# Patient Record
Sex: FEMALE | Race: WHITE | NOT HISPANIC OR LATINO | Employment: OTHER | URBAN - METROPOLITAN AREA
[De-identification: names, ages, dates, MRNs, and addresses within clinical notes are randomized per-mention and may not be internally consistent; named-entity substitution may affect disease eponyms.]

---

## 2020-11-09 ENCOUNTER — THERAPY VISIT (OUTPATIENT)
Dept: PHYSICAL THERAPY | Facility: CLINIC | Age: 55
End: 2020-11-09
Payer: COMMERCIAL

## 2020-11-09 DIAGNOSIS — M62.89 PELVIC FLOOR DYSFUNCTION IN FEMALE: ICD-10-CM

## 2020-11-09 DIAGNOSIS — K59.02 CONSTIPATION DUE TO OUTLET DYSFUNCTION: ICD-10-CM

## 2020-11-09 DIAGNOSIS — N39.46 MIXED INCONTINENCE: ICD-10-CM

## 2020-11-09 PROCEDURE — 97112 NEUROMUSCULAR REEDUCATION: CPT | Mod: GP | Performed by: PHYSICAL THERAPIST

## 2020-11-09 PROCEDURE — 97161 PT EVAL LOW COMPLEX 20 MIN: CPT | Mod: GP | Performed by: PHYSICAL THERAPIST

## 2020-11-09 PROCEDURE — 97530 THERAPEUTIC ACTIVITIES: CPT | Mod: GP | Performed by: PHYSICAL THERAPIST

## 2020-11-09 NOTE — PROGRESS NOTES
[facility] Initial Evaluation  Subjective:  The history is provided by the patient. No  was used.   Patient Health History  Amina Harden being seen for pelvic floor.     Date of Onset: unsure.   Problem occurred: unsure   Pain is reported as 0/10 on pain scale.  General health as reported by patient is good.  Pertinent medical history includes: overweight, menopausal and other (vertigo).     Medical allergies: none.   Surgeries include:  Other (mohs).    Current medications:  Other (probiotic).    Current occupation is self employed realtor.   Primary job tasks include:  Prolonged sitting, prolonged standing, lifting/carrying and repetitive tasks.                  Therapist Generated HPI Evaluation  Problem details: Date of MD order for this visit was 9-25-20. Amina was referred by Dr Horvath with [facility] Rect[facility] Surgery. Amina states she is being seen for weakened pelvic floor muscles with constipation and urinary issues. This has been for 4-5 years, with the past year being the worst.     Bowel-States she does well if she eats healthy and stays on her probiotic. Has a BM daily, 1-2x, type 4 in general on [facility]ol stool chart. She will has 1-2 bouts of constipation per month. Sometimes has to strain to go but if constipated she will sometimes have to manually go into the vagina and push to assist(not very often). Does not monitor fiber but does eat a lot of veggies and whole grain.     Bladder-Wears a pantiliner when out but not always, does wear for volleyball or when showing a lot of houses. States she can have urinary leakage with playing volleyball, sometimes with cough/sneeze. Wakes at night to void 2-3x and she may dribble on way to bathroom with this. She has had 6 episodes (in past 3 mos) where she lost full bladder of urine. Amina does state she has urgency. Day voids 2-4 hrs, but when close to bathroom (working from home)she gets the urgency and wants to void more.  Some days will have 6 leakage episodes during the day and others less. Urine stream is small amounts, does not have dribbling after voiding and does feel empty    Fluids per day- 60 oz water, 8-10 oz coffee, 24 oz milk     No internal pelvic pain noted    Gets exercise with work, not as much during this covid year. Hip pain if walks for exercise.   .         Type of problem:  Pelvic dysfunction, other and incontinence (pelvic floor weakness, constipation, urinary urgency).    This is a chronic condition.  Condition occurred with:  Insidious onset.  Where condition occurred: for unknown reasons.  Patient reports pain:  N/a (no pelvic pain, does have body pain in general).      Since onset symptoms are gradually worsening.  Symptoms are exacerbated by sneezing, coughing and other (volleyball, being near bathroom)  and relieved by other (eating better and drinking more water for bowel, nothing bladder).      Restrictions due to condition include:  Working in normal job without restrictions.  Barriers include:  None as reported by patient.                        Objective:  System                                 Pelvic Dysfunction Evaluation:        Flexibility:    Tightness present at:Hamstrings and Piriformis    Abdominal Wall:  Abdominal wall pelvic: hypomobile R LQ>L, inferior ribs.        Pelvic Clock Exam:    Ischiocavernosis pain:  -  Bulbocavernosis pain:  -  Transverse Perineal:  -  Levator ANI:  -  Perineal Body:  -  SI Provocation:    Positive for: ASLR      Reflex Testing:  NA    External Assessment:  External assessment pelvic: mild atrophy, states she does have small rectocele.  Skin Condition:  Hemorrhoids      Tissue Symmetry:  Normal  Introitus:  Normal  Muscle Contraction/Perineal Mobility:  Substitution  Internal Assessment:  Internal assessment pelvic: weak mm activation but able to activate through all layers(L deep less)  and initially heavy glute use, mild adductor use L.  Sensory Exam:   Normal  Contraction/Grade:  Fair squeeze, definite lift (3)  Accessory Muscle use-Abdominals:  X  Accessory Muscle use-Gluteals:  X      SEMG Biofeedback:  Semg biofeedback pelvic: seated defecation position-high rest tone, poor pushing mechanics(breath holding)  Equipment:  Pathway    Suraface electrode placement--Perianal:  X  Baseline EMG PM:  5.5 to 3.6 uV      Sustained contraction:  Phasic ave=11  tonic ave=9  EMG interpretation to fatigue:  8-10 seconds  Position:  SupineAdditional History:  Delivery History:  Vaginal delivery and tearing  Number of Pregnancies: 5  Number of Live Births: 5  Caffeine Consumption:  8-10 oz          Hip Evaluation  Hip PROM:  : tight end range. : tight end range.                          Hip Strength:    Flexion:   Left: 5/5   Pain:  Right: 5/5   Pain:                    Extension:  Left: 4-/5  Pain:Right: 4+/5    Pain:    Abduction:  Left: 4+/5     Pain:Right: 4+/5    Pain:    Internal Rotation:  Left: 5/5    Pain:Right: 5/5   Pain:  External Rotation:  Left: 5/5   Pain:  Right: 5/5   Pain:                           General     ROS    Assessment/Plan:    Patient is a 55 year old female with pelvic complaints.    Patient has the following significant findings with corresponding treatment plan.                Diagnosis 1:  Mixed incontinence, constipation, pelvic floor dysfunction  Decreased ROM/flexibility - manual therapy, therapeutic exercise and home program  Decreased strength - therapeutic exercise, therapeutic activities and home program  Decreased proprioception - neuro re-education, therapeutic activities and home program  Impaired muscle performance - biofeedback, neuro re-education and home program  Decreased function - therapeutic activities and home program    Therapy Evaluation Codes:   1) History comprised of:   Personal factors that impact the plan of care:      Age and Time since onset of symptoms.    Comorbidity factors that impact the plan of care are:       None.     Medications impacting care: None.  2) Examination of Body Systems comprised of:   Body structures and functions that impact the plan of care:      Hip and Pelvis.   Activity limitations that impact the plan of care are:      Sports, Frequency, Stress incontinence, Urgency, Urge incontinence and constipation.  3) Clinical presentation characteristics are:   Stable/Uncomplicated.  4) Decision-Making    Low complexity using standardized patient assessment instrument and/or measureable assessment of functional outcome.  Cumulative Therapy Evaluation is: Low complexity.    Previous and current functional limitations:  (See Goal Flow Sheet for this information)    Short term and Long term goals: (See Goal Flow Sheet for this information)     Communication ability:  Patient appears to be able to clearly communicate and understand verbal and written communication and follow directions correctly.  Treatment Explanation - The following has been discussed with the patient:   RX ordered/plan of care  Anticipated outcomes  Possible risks and side effects  This patient would benefit from PT intervention to resume normal activities.   Rehab potential is good.    Frequency:  1 X week, once daily  Duration:  for 4 weeks tapering to 2 X a month over 2 months  Discharge Plan:  Achieve all LTG.  Independent in home treatment program.  Reach maximal therapeutic benefit.    Please refer to the daily flowsheet for treatment today, total treatment time and time spent performing 1:1 timed codes.

## 2020-11-16 ENCOUNTER — THERAPY VISIT (OUTPATIENT)
Dept: PHYSICAL THERAPY | Facility: CLINIC | Age: 55
End: 2020-11-16
Payer: COMMERCIAL

## 2020-11-16 DIAGNOSIS — M62.89 PELVIC FLOOR DYSFUNCTION IN FEMALE: ICD-10-CM

## 2020-11-16 DIAGNOSIS — N39.46 MIXED INCONTINENCE: ICD-10-CM

## 2020-11-16 DIAGNOSIS — K59.02 CONSTIPATION DUE TO OUTLET DYSFUNCTION: ICD-10-CM

## 2020-11-16 PROCEDURE — 97110 THERAPEUTIC EXERCISES: CPT | Mod: GP | Performed by: PHYSICAL THERAPIST

## 2020-11-16 PROCEDURE — 97112 NEUROMUSCULAR REEDUCATION: CPT | Mod: GP | Performed by: PHYSICAL THERAPIST

## 2020-11-23 ENCOUNTER — THERAPY VISIT (OUTPATIENT)
Dept: PHYSICAL THERAPY | Facility: CLINIC | Age: 55
End: 2020-11-23
Payer: COMMERCIAL

## 2020-11-23 DIAGNOSIS — N39.46 MIXED INCONTINENCE: ICD-10-CM

## 2020-11-23 DIAGNOSIS — K59.02 CONSTIPATION DUE TO OUTLET DYSFUNCTION: ICD-10-CM

## 2020-11-23 DIAGNOSIS — M62.89 PELVIC FLOOR DYSFUNCTION IN FEMALE: ICD-10-CM

## 2020-11-23 PROCEDURE — 97140 MANUAL THERAPY 1/> REGIONS: CPT | Mod: GP | Performed by: PHYSICAL THERAPIST

## 2020-11-23 PROCEDURE — 97112 NEUROMUSCULAR REEDUCATION: CPT | Mod: GP | Performed by: PHYSICAL THERAPIST

## 2020-11-30 ENCOUNTER — THERAPY VISIT (OUTPATIENT)
Dept: PHYSICAL THERAPY | Facility: CLINIC | Age: 55
End: 2020-11-30
Payer: COMMERCIAL

## 2020-11-30 DIAGNOSIS — N39.46 MIXED INCONTINENCE: ICD-10-CM

## 2020-11-30 DIAGNOSIS — K59.02 CONSTIPATION DUE TO OUTLET DYSFUNCTION: ICD-10-CM

## 2020-11-30 DIAGNOSIS — M62.89 PELVIC FLOOR DYSFUNCTION IN FEMALE: ICD-10-CM

## 2020-11-30 PROCEDURE — 97110 THERAPEUTIC EXERCISES: CPT | Mod: GP | Performed by: PHYSICAL THERAPIST

## 2020-11-30 PROCEDURE — 97140 MANUAL THERAPY 1/> REGIONS: CPT | Mod: GP | Performed by: PHYSICAL THERAPIST

## 2020-11-30 NOTE — LETTER
[facility]  [address]  [address]  [place] [address]  [phone]    2021    Re: Amina Harden   :   1965  MRN:  [id number]   REFERRING PHYSICIAN:   Carolyn PIERCE PT    Date of Initial Evaluation:  2020  Visits:  Rxs Used: 4  Reason for Referral:     Mixed incontinence  Constipation due to outlet dysfunction  Pelvic floor dysfunction in female    EVALUATION SUMMARY    Discharge Note    Progress reporting period is from initial evaluation date (please see noted date below) to 2020.  Linked Episodes   Type: Episode: Status: Noted: Resolved: Last update: Updated by:   PHYSICAL THERAPY pelvic floor 20 Active 2020 10:02 AM Amie Umanzor, PT      Comments:       Amina failed to follow up and current status is unknown.  Please see information below for last relevant information on current status.  Patient seen for 4 visits.    SUBJECTIVE  Subjective changes noted by patient: The following is from last visit seen- slight set back with thanksgiving foods with bowel system otherwise has been improved  .  Current pain level is  .  0/10 pelvic floor  Previous pain level was  0/10(pelvic floor).   Changes in function:  Yes (See Goal flowsheet attached for changes in current functional level)  Adverse reaction to treatment or activity: None    OBJECTIVE  Changes noted in objective findings: did not complete POC  ASSESSMENT/PLAN  Diagnosis: pelvic floor dysfunction, mixed incontinence, constipation   Updated problem list and treatment plan:   Did not complete plan of care, continue with HEP    Re: Amina Harden   :   1965    STG/LTGs have been met or progress has been made towards goals:  Yes, please see goal flowsheet for most current information  Assessment of Progress: current status is unknown.    Last current status:     Self Management Plans:  HEP  I have re-evaluated this patient and find that the  nature, scope, duration and intensity of the therapy is appropriate for the medical condition of the patient.  Amina continues to require the following intervention to meet STG and LTG's:  HEP.    Recommendations:  Discharge with current home program.  Patient to follow up with MD as needed.    Thank you for your referral.    INQUIRIES  Therapist: Amie Umanzor, PT  [facility]  [address]  [place] [address]  Phone: [phone]  Fax: [phone]

## 2021-01-20 PROBLEM — N39.46 MIXED INCONTINENCE: Status: RESOLVED | Noted: 2020-11-09 | Resolved: 2021-01-20

## 2021-01-20 PROBLEM — M62.89 PELVIC FLOOR DYSFUNCTION IN FEMALE: Status: RESOLVED | Noted: 2020-11-09 | Resolved: 2021-01-20

## 2021-01-20 PROBLEM — K59.02 CONSTIPATION DUE TO OUTLET DYSFUNCTION: Status: RESOLVED | Noted: 2020-11-09 | Resolved: 2021-01-20

## 2021-01-20 NOTE — PROGRESS NOTES
Discharge Note    Progress reporting period is from initial evaluation date (please see noted date below) to Nov 30, 2020.  Linked Episodes   Type: Episode: Status: Noted: Resolved: Last update: Updated by:   PHYSICAL THERAPY pelvic floor 11-9-20 Active 11/9/2020 11/30/2020 10:02 AM Amie Umanzor PT      Comments:       Amina failed to follow up and current status is unknown.  Please see information below for last relevant information on current status.  Patient seen for 4 visits.    SUBJECTIVE  Subjective changes noted by patient: The following is from last visit seen- slight set back with thanksgiving foods with bowel system otherwise has been improved  .  Current pain level is  .  0/10 pelvic floor  Previous pain level was  0/10(pelvic floor).   Changes in function:  Yes (See Goal flowsheet attached for changes in current functional level)  Adverse reaction to treatment or activity: None    OBJECTIVE  Changes noted in objective findings: did not complete POC  ASSESSMENT/PLAN  Diagnosis: pelvic floor dysfunction, mixed incontinence, constipation   Updated problem list and treatment plan:   Did not complete plan of care, continue with HEP  STG/LTGs have been met or progress has been made towards goals:  Yes, please see goal flowsheet for most current information  Assessment of Progress: current status is unknown.    Last current status:     Self Management Plans:  HEP  I have re-evaluated this patient and find that the nature, scope, duration and intensity of the therapy is appropriate for the medical condition of the patient.  Amina continues to require the following intervention to meet STG and LTG's:  HEP.    Recommendations:  Discharge with current home program.  Patient to follow up with MD as needed.    Please refer to the daily flowsheet for treatment today, total treatment time and time spent performing 1:1 timed codes.

## 2023-12-08 ENCOUNTER — HOSPITAL ENCOUNTER (EMERGENCY)
Facility: CLINIC | Age: 58
Discharge: HOME | End: 2023-12-09
Attending: EMERGENCY MEDICINE | Admitting: EMERGENCY MEDICINE
Payer: COMMERCIAL

## 2023-12-08 DIAGNOSIS — R10.84 GENERALIZED ABDOMINAL PAIN: ICD-10-CM

## 2023-12-08 LAB
BASOPHILS # BLD AUTO: 0 10E3/UL (ref 0–0.2)
BASOPHILS NFR BLD AUTO: 0 %
EOSINOPHIL # BLD AUTO: 0.1 10E3/UL (ref 0–0.7)
EOSINOPHIL NFR BLD AUTO: 1 %
ERYTHROCYTE [DISTWIDTH] IN BLOOD BY AUTOMATED COUNT: 12.7 % (ref 10–15)
HCT VFR BLD AUTO: 39.4 % (ref 35–47)
HGB BLD-MCNC: 13.1 G/DL (ref 11.7–15.7)
IMM GRANULOCYTES # BLD: 0 10E3/UL
IMM GRANULOCYTES NFR BLD: 0 %
LYMPHOCYTES # BLD AUTO: 1.4 10E3/UL (ref 0.8–5.3)
LYMPHOCYTES NFR BLD AUTO: 12 %
MCH RBC QN AUTO: 28.2 PG (ref 26.5–33)
MCHC RBC AUTO-ENTMCNC: 33.2 G/DL (ref 31.5–36.5)
MCV RBC AUTO: 85 FL (ref 78–100)
MONOCYTES # BLD AUTO: 0.5 10E3/UL (ref 0–1.3)
MONOCYTES NFR BLD AUTO: 5 %
NEUTROPHILS # BLD AUTO: 9.6 10E3/UL (ref 1.6–8.3)
NEUTROPHILS NFR BLD AUTO: 82 %
NRBC # BLD AUTO: 0 10E3/UL
NRBC BLD AUTO-RTO: 0 /100
PLATELET # BLD AUTO: 225 10E3/UL (ref 150–450)
RBC # BLD AUTO: 4.65 10E6/UL (ref 3.8–5.2)
WBC # BLD AUTO: 11.7 10E3/UL (ref 4–11)

## 2023-12-08 PROCEDURE — 85014 HEMATOCRIT: CPT | Performed by: FAMILY MEDICINE

## 2023-12-08 PROCEDURE — 80053 COMPREHEN METABOLIC PANEL: CPT | Performed by: FAMILY MEDICINE

## 2023-12-08 PROCEDURE — 99284 EMERGENCY DEPT VISIT MOD MDM: CPT | Mod: 25 | Performed by: EMERGENCY MEDICINE

## 2023-12-08 PROCEDURE — 84484 ASSAY OF TROPONIN QUANT: CPT | Performed by: EMERGENCY MEDICINE

## 2023-12-08 PROCEDURE — 99284 EMERGENCY DEPT VISIT MOD MDM: CPT | Performed by: EMERGENCY MEDICINE

## 2023-12-08 PROCEDURE — 83690 ASSAY OF LIPASE: CPT | Performed by: EMERGENCY MEDICINE

## 2023-12-08 PROCEDURE — 36415 COLL VENOUS BLD VENIPUNCTURE: CPT | Performed by: FAMILY MEDICINE

## 2023-12-09 VITALS
HEART RATE: 64 BPM | OXYGEN SATURATION: 97 % | SYSTOLIC BLOOD PRESSURE: 178 MMHG | BODY MASS INDEX: 31.31 KG/M2 | WEIGHT: 206.6 LBS | DIASTOLIC BLOOD PRESSURE: 104 MMHG | TEMPERATURE: 98.6 F | RESPIRATION RATE: 20 BRPM | HEIGHT: 68 IN

## 2023-12-09 PROBLEM — E78.00 HYPERCHOLESTEROLEMIA: Status: ACTIVE | Noted: 2022-01-20

## 2023-12-09 PROBLEM — K62.5 HEMORRHAGE OF RECTUM AND ANUS: Status: ACTIVE | Noted: 2020-06-18

## 2023-12-09 PROBLEM — E66.811 OBESITY (BMI 30.0-34.9): Status: ACTIVE | Noted: 2020-08-18

## 2023-12-09 PROBLEM — K64.8 INTERNAL HEMORRHOIDS: Status: ACTIVE | Noted: 2020-08-14

## 2023-12-09 PROBLEM — M62.89 PELVIC FLOOR DYSFUNCTION: Status: ACTIVE | Noted: 2023-03-22

## 2023-12-09 PROBLEM — C44.90 MALIGNANT NEOPLASM OF SKIN: Status: ACTIVE | Noted: 2020-02-11

## 2023-12-09 PROBLEM — R00.2 HEART PALPITATIONS: Status: ACTIVE | Noted: 2020-08-18

## 2023-12-09 PROBLEM — K59.00 CONSTIPATION: Status: ACTIVE | Noted: 2023-03-22

## 2023-12-09 LAB
ALBUMIN SERPL BCG-MCNC: 4.5 G/DL (ref 3.5–5.2)
ALBUMIN UR-MCNC: NEGATIVE MG/DL
ALP SERPL-CCNC: 105 U/L (ref 40–150)
ALT SERPL W P-5'-P-CCNC: 14 U/L (ref 0–50)
ANION GAP SERPL CALCULATED.3IONS-SCNC: 10 MMOL/L (ref 7–15)
APPEARANCE UR: ABNORMAL
AST SERPL W P-5'-P-CCNC: 19 U/L (ref 0–45)
BILIRUB SERPL-MCNC: 0.2 MG/DL
BILIRUB UR QL STRIP: NEGATIVE
BUN SERPL-MCNC: 13.5 MG/DL (ref 8–23)
CALCIUM SERPL-MCNC: 9.4 MG/DL (ref 8.8–10.2)
CHLORIDE SERPL-SCNC: 102 MMOL/L (ref 98–107)
COLOR UR AUTO: YELLOW
CREAT SERPL-MCNC: 0.63 MG/DL (ref 0.51–0.95)
DEPRECATED HCO3 PLAS-SCNC: 24 MMOL/L (ref 22–29)
EGFRCR SERPLBLD CKD-EPI 2021: >90 ML/MIN/1.73M2
GLUCOSE SERPL-MCNC: 138 MG/DL (ref 70–99)
GLUCOSE UR STRIP-MCNC: NEGATIVE MG/DL
HGB UR QL STRIP: ABNORMAL
HOLD SPECIMEN: NORMAL
HOLD SPECIMEN: NORMAL
KETONES UR STRIP-MCNC: NEGATIVE MG/DL
LEUKOCYTE ESTERASE UR QL STRIP: NEGATIVE
LIPASE SERPL-CCNC: 20 U/L (ref 13–60)
NITRATE UR QL: NEGATIVE
PH UR STRIP: 7 [PH] (ref 5–7)
POTASSIUM SERPL-SCNC: 4 MMOL/L (ref 3.4–5.3)
PROT SERPL-MCNC: 7.4 G/DL (ref 6.4–8.3)
RBC URINE: 5 /HPF
SODIUM SERPL-SCNC: 136 MMOL/L (ref 135–145)
SP GR UR STRIP: 1.02 (ref 1–1.03)
TROPONIN T SERPL HS-MCNC: <6 NG/L
UROBILINOGEN UR STRIP-MCNC: NORMAL MG/DL
WBC URINE: 3 /HPF
YEAST #/AREA URNS HPF: ABNORMAL /HPF

## 2023-12-09 PROCEDURE — 250N000013 HC RX MED GY IP 250 OP 250 PS 637: Performed by: EMERGENCY MEDICINE

## 2023-12-09 PROCEDURE — 250N000011 HC RX IP 250 OP 636: Mod: JZ | Performed by: EMERGENCY MEDICINE

## 2023-12-09 PROCEDURE — 93005 ELECTROCARDIOGRAM TRACING: CPT | Performed by: EMERGENCY MEDICINE

## 2023-12-09 PROCEDURE — 96374 THER/PROPH/DIAG INJ IV PUSH: CPT | Performed by: EMERGENCY MEDICINE

## 2023-12-09 PROCEDURE — 258N000003 HC RX IP 258 OP 636: Performed by: EMERGENCY MEDICINE

## 2023-12-09 PROCEDURE — 96361 HYDRATE IV INFUSION ADD-ON: CPT | Performed by: EMERGENCY MEDICINE

## 2023-12-09 PROCEDURE — 81001 URINALYSIS AUTO W/SCOPE: CPT | Performed by: FAMILY MEDICINE

## 2023-12-09 PROCEDURE — 93010 ELECTROCARDIOGRAM REPORT: CPT | Performed by: EMERGENCY MEDICINE

## 2023-12-09 RX ORDER — METOCLOPRAMIDE HYDROCHLORIDE 5 MG/ML
10 INJECTION INTRAMUSCULAR; INTRAVENOUS ONCE
Status: COMPLETED | OUTPATIENT
Start: 2023-12-09 | End: 2023-12-09

## 2023-12-09 RX ORDER — POLYETHYLENE GLYCOL 3350 17 G/17G
17 POWDER, FOR SOLUTION ORAL ONCE
Status: COMPLETED | OUTPATIENT
Start: 2023-12-09 | End: 2023-12-09

## 2023-12-09 RX ADMIN — METOCLOPRAMIDE HYDROCHLORIDE 10 MG: 5 INJECTION INTRAMUSCULAR; INTRAVENOUS at 00:42

## 2023-12-09 RX ADMIN — POLYETHYLENE GLYCOL 3350 17 G: 17 POWDER, FOR SOLUTION ORAL at 00:42

## 2023-12-09 RX ADMIN — SODIUM CHLORIDE, POTASSIUM CHLORIDE, SODIUM LACTATE AND CALCIUM CHLORIDE 1000 ML: 600; 310; 30; 20 INJECTION, SOLUTION INTRAVENOUS at 00:39

## 2023-12-09 ASSESSMENT — ENCOUNTER SYMPTOMS
COUGH: 0
ABDOMINAL PAIN: 1
NAUSEA: 1
BACK PAIN: 0
SHORTNESS OF BREATH: 0
DIARRHEA: 0
VOMITING: 0
LIGHT-HEADEDNESS: 0
DYSURIA: 0
FLANK PAIN: 0
DIAPHORESIS: 0
CONSTIPATION: 1
FATIGUE: 0
APPETITE CHANGE: 1
HEADACHES: 0
CHEST TIGHTNESS: 0
FREQUENCY: 0
ABDOMINAL DISTENTION: 1
FEVER: 0

## 2023-12-09 ASSESSMENT — ACTIVITIES OF DAILY LIVING (ADL): ADLS_ACUITY_SCORE: 35

## 2023-12-09 NOTE — ED NOTES
"Patient states \"I am feeling better, I would like to go home now, I am getting a headache from being over tired.\"  "

## 2023-12-09 NOTE — ED PROVIDER NOTES
History     Chief Complaint   Patient presents with    Abdominal Pain    Back Pain     HPI  Amina Harden is a 58 year old female with a history of obesity and hyperlipidemia presenting for evaluation of abdominal pain.  Patient was abrupt onset of severe abdominal pain tonight.  She reports diffuse pain across her upper abdomen and both sides of her abdomen radiating through to her back on both sides of her mid back.  Pain was associated with nausea but no vomiting.  Patient reports she has been feeling somewhat backed up recently.  She states  her diet has been slightly off recently due to family visiting from out of town.  Patient denies any chest pain or difficulty breathing.  Symptoms were excruciating earlier tonight but have subsided while in the ED.  Patient reports she still has been having regular bowel movements but they have been much narrower than normal.  Denies any rectal pain or pressure currently.  Still feels very bloated throughout her abdomen especially her upper abdomen.  Denies any dysuria, urgency, frequency.  Has not had her menses in several years and denies vaginal discharge or bleeding.    Allergies:  No Known Allergies    Problem List:    Patient Active Problem List    Diagnosis Date Noted    Constipation 03/22/2023     Priority: Medium    Pelvic floor dysfunction 03/22/2023     Priority: Medium    Hypercholesterolemia 01/20/2022     Priority: Medium     Formatting of this note might be different from the original. ASCVD risk score 3.2%, recommended lifestyle changes      Heart palpitations 08/18/2020     Priority: Medium    Obesity (BMI 30.0-34.9) 08/18/2020     Priority: Medium    Internal hemorrhoids 08/14/2020     Priority: Medium    Hemorrhage of rectum and anus 06/18/2020     Priority: Medium    Malignant neoplasm of skin 02/11/2020     Priority: Medium     Formatting of this note might be different from the original. 2/2020, nasal tip, BCC: [facility] [place] 2/25/2020 [facility] 2/21/2022,  "left mid cheek, Basal Cell Carcinoma, 5/10/2022 [facility] [place] [facility]      Depressive disorder 09/15/2006     Priority: Medium        Past Medical History:    No past medical history on file.    Past Surgical History:    No past surgical history on file.    Family History:    No family history on file.    Social History:  Marital Status:   [4]        Medications:    No current outpatient medications on file.        Review of Systems   Constitutional:  Positive for appetite change (Decreased). Negative for diaphoresis, fatigue and fever.   HENT:  Negative for congestion.    Respiratory:  Negative for cough, chest tightness and shortness of breath.    Cardiovascular:  Negative for chest pain.   Gastrointestinal:  Positive for abdominal distention, abdominal pain, constipation and nausea. Negative for diarrhea and vomiting.   Genitourinary:  Negative for decreased urine volume, dysuria, flank pain, frequency, urgency and vaginal bleeding.   Musculoskeletal:  Negative for back pain.   Neurological:  Negative for light-headedness and headaches.   All other systems reviewed and are negative.      Physical Exam   BP: (!) 189/117  Pulse: 64  Temp: 98.6  F (37  C)  Resp: 20  Height: 172.7 cm (5' 8\")  Weight: 93.7 kg (206 lb 9.6 oz)  SpO2: 97 %      Physical Exam  Vitals and nursing note reviewed.   Constitutional:       Appearance: She is well-developed. She is obese. She is not ill-appearing or diaphoretic.   HENT:      Head: Atraumatic.   Cardiovascular:      Rate and Rhythm: Normal rate.   Pulmonary:      Effort: Pulmonary effort is normal.   Abdominal:      General: Abdomen is protuberant. Bowel sounds are normal.      Palpations: Abdomen is soft.      Tenderness: There is abdominal tenderness in the epigastric area. There is no guarding or rebound.   Skin:     General: Skin is warm and dry.      Capillary Refill: Capillary refill takes less than 2 seconds.   Neurological:      Mental Status: She is alert and " oriented to person, place, and time.   Psychiatric:         Mood and Affect: Mood normal.         ED Course                 Procedures              EKG Interpretation:      Interpreted by Bao Desir MD  Time reviewed:0039   Symptoms at time of EKG: abdominal pain, right shoulder pain   Rhythm: normal sinus   Rate: normal  Axis: NORMAL  Ectopy: none  Conduction: normal  ST Segments/ T Waves: No ST-T wave changes  Q Waves: none  Comparison to prior: No old EKG available    Clinical Impression: normal EKG         Results for orders placed or performed during the hospital encounter of 12/08/23 (from the past 24 hour(s))   CBC with platelets, differential    Narrative    The following orders were created for panel order CBC with platelets, differential.  Procedure                               Abnormality         Status                     ---------                               -----------         ------                     CBC with platelets and d...[[id number]]  Abnormal            Final result                 Please view results for these tests on the individual orders.   Comprehensive metabolic panel   Result Value Ref Range    Sodium 136 135 - 145 mmol/L    Potassium 4.0 3.4 - 5.3 mmol/L    Carbon Dioxide (CO2) 24 22 - 29 mmol/L    Anion Gap 10 7 - 15 mmol/L    Urea Nitrogen 13.5 8.0 - 23.0 mg/dL    Creatinine 0.63 0.51 - 0.95 mg/dL    GFR Estimate >90 >60 mL/min/1.73m2    Calcium 9.4 8.8 - 10.2 mg/dL    Chloride 102 98 - 107 mmol/L    Glucose 138 (H) 70 - 99 mg/dL    Alkaline Phosphatase 105 40 - 150 U/L    AST 19 0 - 45 U/L    ALT 14 0 - 50 U/L    Protein Total 7.4 6.4 - 8.3 g/dL    Albumin 4.5 3.5 - 5.2 g/dL    Bilirubin Total 0.2 <=1.2 mg/dL   [facility] Draw    Narrative    The following orders were created for panel order [facility] Draw.  Procedure                               Abnormality         Status                     ---------                               -----------         ------                      Extra Blue Top Tube[[id number]]                              Final result               Extra Red Top Tube[[id number]]                               Final result                 Please view results for these tests on the individual orders.   CBC with platelets and differential   Result Value Ref Range    WBC Count 11.7 (H) 4.0 - 11.0 10e3/uL    RBC Count 4.65 3.80 - 5.20 10e6/uL    Hemoglobin 13.1 11.7 - 15.7 g/dL    Hematocrit 39.4 35.0 - 47.0 %    MCV 85 78 - 100 fL    MCH 28.2 26.5 - 33.0 pg    MCHC 33.2 31.5 - 36.5 g/dL    RDW 12.7 10.0 - 15.0 %    Platelet Count 225 150 - 450 10e3/uL    % Neutrophils 82 %    % Lymphocytes 12 %    % Monocytes 5 %    % Eosinophils 1 %    % Basophils 0 %    % Immature Granulocytes 0 %    NRBCs per 100 WBC 0 <1 /100    Absolute Neutrophils 9.6 (H) 1.6 - 8.3 10e3/uL    Absolute Lymphocytes 1.4 0.8 - 5.3 10e3/uL    Absolute Monocytes 0.5 0.0 - 1.3 10e3/uL    Absolute Eosinophils 0.1 0.0 - 0.7 10e3/uL    Absolute Basophils 0.0 0.0 - 0.2 10e3/uL    Absolute Immature Granulocytes 0.0 <=0.4 10e3/uL    Absolute NRBCs 0.0 10e3/uL   Extra Blue Top Tube   Result Value Ref Range    Hold Specimen JIC    Extra Red Top Tube   Result Value Ref Range    Hold Specimen JIC    Lipase   Result Value Ref Range    Lipase 20 13 - 60 U/L   Troponin T, High Sensitivity   Result Value Ref Range    Troponin T, High Sensitivity <6 <=14 ng/L   UA with Microscopic reflex to Culture    Specimen: Urine, Clean Catch   Result Value Ref Range    Color Urine Yellow Colorless, Straw, Light Yellow, Yellow    Appearance Urine Cloudy (A) Clear    Glucose Urine Negative Negative mg/dL    Bilirubin Urine Negative Negative    Ketones Urine Negative Negative mg/dL    Specific Gravity Urine 1.019 1.003 - 1.035    Blood Urine Moderate (A) Negative    pH Urine 7.0 5.0 - 7.0    Protein Albumin Urine Negative Negative mg/dL    Urobilinogen Urine Normal Normal, 2.0 mg/dL    Nitrite Urine Negative Negative    Leukocyte  Esterase Urine Negative Negative    Budding Yeast Urine Few (A) None Seen /HPF    RBC Urine 5 (H) <=2 /HPF    WBC Urine 3 <=5 /HPF    Narrative    Urine Culture not indicated       Medications   lactated ringers BOLUS 1,000 mL (1,000 mLs Intravenous $New Bag 12/9/23 0039)   metoclopramide (REGLAN) injection 10 mg (10 mg Intravenous $Given 12/9/23 0042)   polyethylene glycol (MIRALAX) Packet 17 g (17 g Oral $Given 12/9/23 0042)     1:10 AM Patient re-assessed: Patient resting quite comfortably now.  She reports her pain has subsided and she has no nausea currently.  Still feeling some grumbling in her stomach influenced while but not painful.  Patient feels she can go home and get some rest.      Assessments & Plan (with Medical Decision Making)  58-year-old presenting for evaluation of severe cramping upper abdominal pain and back pain.  Had several bouts of severe pain prompting her to come in for evaluation.  Pain associate with nausea and hypertension.  Symptoms abated somewhat without intervention here.  She does report changing her diet recently and has been feeling somewhat constipated.  Clinically symptoms suggestive of constipation.  Labs reassuring with normal electrolytes and kidney function.  Mildly elevated white count is nonspecific.  Normal lipase.  She did have some pain to her right shoulder so screening EKG and troponin were performed and were normal.  Urinalysis showed a few red cells but no evidence of infection.  Symptoms were diffuse and not lateralized to suggest a kidney stone.  Patient treated for presumed constipation with some IV fluids, metoclopramide, and MiraLAX.  She overall is feeling much better although continued to have some abdominal cramping.  She felt well enough to go home so was discharged home with plan for expected improvement.  Counseled to return should she have new or worsening symptoms     I have reviewed the nursing notes.    I have reviewed the findings, diagnosis, plan  and need for follow up with the patient.      New Prescriptions    No medications on file       Final diagnoses:   Generalized abdominal pain - likely constipation       12/8/2023   [facility] EMERGENCY DEPT       Desir, Bao Henry MD  12/09/23 0117

## 2023-12-09 NOTE — ED TRIAGE NOTES
Pt complaining of abdominal and back pain starting yesterday but becoming much worse tonight. Also having some dizziness and nausea.      Triage Assessment (Adult)       Row Name 12/08/23 2300          Triage Assessment    Airway WDL WDL        Respiratory WDL    Respiratory WDL WDL        Skin Circulation/Temperature WDL    Skin Circulation/Temperature WDL WDL        Cardiac WDL    Cardiac WDL WDL        Peripheral/Neurovascular WDL    Peripheral Neurovascular WDL WDL        Cognitive/Neuro/Behavioral WDL    Cognitive/Neuro/Behavioral WDL WDL